# Patient Record
Sex: MALE | Race: WHITE
[De-identification: names, ages, dates, MRNs, and addresses within clinical notes are randomized per-mention and may not be internally consistent; named-entity substitution may affect disease eponyms.]

---

## 2018-02-09 ENCOUNTER — HOSPITAL ENCOUNTER (OUTPATIENT)
Dept: HOSPITAL 68 - ERH | Age: 58
Setting detail: OBSERVATION
LOS: 1 days | End: 2018-02-10
Attending: INTERNAL MEDICINE | Admitting: INTERNAL MEDICINE
Payer: COMMERCIAL

## 2018-02-09 VITALS — BODY MASS INDEX: 20.76 KG/M2 | HEIGHT: 70 IN | WEIGHT: 145 LBS

## 2018-02-09 DIAGNOSIS — R51: ICD-10-CM

## 2018-02-09 DIAGNOSIS — E78.5: ICD-10-CM

## 2018-02-09 DIAGNOSIS — A69.20: ICD-10-CM

## 2018-02-09 DIAGNOSIS — Z23: ICD-10-CM

## 2018-02-09 DIAGNOSIS — I16.0: Primary | ICD-10-CM

## 2018-02-09 DIAGNOSIS — J98.4: ICD-10-CM

## 2018-02-09 DIAGNOSIS — F90.9: ICD-10-CM

## 2018-02-09 LAB
ABSOLUTE GRANULOCYTE CT: 3.7 /CUMM (ref 1.4–6.5)
BASOPHILS # BLD: 0 /CUMM (ref 0–0.2)
BASOPHILS NFR BLD: 0.5 % (ref 0–2)
EOSINOPHIL # BLD: 0.2 /CUMM (ref 0–0.7)
EOSINOPHIL NFR BLD: 3.1 % (ref 0–5)
ERYTHROCYTE [DISTWIDTH] IN BLOOD BY AUTOMATED COUNT: 14 % (ref 11.5–14.5)
GRANULOCYTES NFR BLD: 58.2 % (ref 42.2–75.2)
HCT VFR BLD CALC: 47.8 % (ref 42–52)
LYMPHOCYTES # BLD: 1.9 /CUMM (ref 1.2–3.4)
MCH RBC QN AUTO: 29.7 PG (ref 27–31)
MCHC RBC AUTO-ENTMCNC: 33.4 G/DL (ref 33–37)
MCV RBC AUTO: 88.9 FL (ref 80–94)
MONOCYTES # BLD: 0.5 /CUMM (ref 0.1–0.6)
PLATELET # BLD: 194 /CUMM (ref 130–400)
PMV BLD AUTO: 8.7 FL (ref 7.4–10.4)
RED BLOOD CELL CT: 5.38 /CUMM (ref 4.7–6.1)
WBC # BLD AUTO: 6.4 /CUMM (ref 4.8–10.8)

## 2018-02-09 PROCEDURE — G0480 DRUG TEST DEF 1-7 CLASSES: HCPCS

## 2018-02-09 PROCEDURE — G0008 ADMIN INFLUENZA VIRUS VAC: HCPCS

## 2018-02-09 PROCEDURE — 86618 LYME DISEASE ANTIBODY: CPT

## 2018-02-09 PROCEDURE — G0378 HOSPITAL OBSERVATION PER HR: HCPCS

## 2018-02-09 NOTE — HISTORY & PHYSICAL
Jhon Ivy MD 02/09/18 6940:
General Information and HPI
MD Statement:
I have seen and personally examined JOANNE ROSA and documented this H&P.
 
The patient is a 57 year old M who presented with a patient stated chief 
complaint of [headache, elevated BP].
 
Source of Information: patient
Exam Limitations: no limitations
History of Present Illness:
Patient is a 57-year-old male with a PMH significant for ADHD, HLD, lung disease
who presented to the ED complaining of headache and elevated blood pressure.  
Patient states that for approximately 5 days he has had a gradually worsening 
frontal headache with a pressure-like sensation behind his eyes.  Patient went 
to urgent 2 days prior to admission and recommended that he get a home BP 
machine to monitor his BP as it was elevated and urgent care.  He states that 
over the past several days he has been checking his BP regularly and was 
initially  and has been elevated ever since.  His headache has 
progressively worsened as well.  Does not kept him from sleeping.  Today he 
presented to the ED because his SBP was over 200 he checked at home.  At the 
time of interview and examination he reports that his headache is resolved.  He 
denies any chest pain, visual changes, eye pain, numbness, tingling, weakness, 
lightheadedness, dizziness, loss of consciousness.
 
Allergies/Medications
Allergies:
Coded Allergies:
Penicillins (Intermediate, HIVES 02/09/18)
 
Home Med list
Amlodipine Besylate 5 MG TABLET   1 TAB PO DAILY HIGH BLOOD PRESSURE
Amlodipine Besylate 5 MG TABLET   1 TAB PO DAILY HIGH BLOOD PRESSURE
     Please take as prescribed. 
Doxycycline Hyclate 100 MG CAPSULE   1 CAP PO BID LYME  (Reported)
Lisinopril 10 MG TABLET   1 TAB PO DAILY HYPERTENSION
Simvastatin (Simvastatin*) 20 MG TABLET   1 TAB PO QPM CHOL  (Reported)
 
 
Past History
 
Travel History
Traveled to [place] past 21 day No
 
Medical History
Neurological: NONE
EENT: NONE
Cardiovascular: hyperlipidemia
Respiratory: NONE
Gastrointestinal: NONE
Hepatic: NONE
Renal: NONE
Musculoskeletal: NONE
Psychiatric: ADHD
Endocrine: NONE
Blood Disorders: NONE
Cancer(s): NONE
GYN/Reproductive: NONE
 
Surgical History
Surgical History: non-contributory
 
Past Family/Social History
 
Family History
Relations & Conditions if any
Relation not specified for:
  *No pertinent family history
 
 
Psychosocial History
Smoking Status: Never Smoked
ETOH Use: occasional use
Illicit Drug Use: denies illicit drug use
 
Functional Ability
ADLs
Independent: dressing, eating, toileting, bathing. 
Ambulation: independent
IADLs
Independent: shopping, housework, finances, food prep, telephone, transportation
, medication admin. 
 
Review of Systems
 
Review of Systems
Constitutional:
Reports: malaise.  Denies: chills, diaphoresis, fever. 
EENTM:
Denies: blurred vision, double vision, visual changes. 
Cardiovascular:
Denies: chest pain, orthopena, palpitations, syncope. 
Respiratory:
Denies: cough, short of breath. 
GI:
Reports: no symptoms. 
Genitourinary:
Reports: no symptoms. 
Musculoskeletal:
Reports: no symptoms. 
Skin:
Reports: no symptoms. 
Neurological/Psychological:
Reports: headache.  Denies: numbness, paresthesia, tingling, weakness. 
 
Exam & Diagnostic Data
Last 24 Hrs of Vital Signs/I&O
 Vital Signs
 
 
Date Time Temp Pulse Resp B/P B/P Pulse O2 O2 Flow FiO2
 
     Mean Ox Delivery Rate 
 
02/10 0452  50  162/98     
 
02/10 0330 98.2 62 20 188/100     
 
02/10 0330  62  188/100     
 
02/10 0053  62 20 180/90  98   
 
02/09 2300  63 20 160/102  98   
 
02/09 2233 98.2 20 98 156/90     
 
02/09 2233  68  156/90     
 
02/09 2148 97.2 65 20 160/100     
 
02/09 2017    160/100     
 
02/09 1952  65  176/106     
 
02/09 1951 97.2 65 20 176/106     
 
02/09 1857 97.2 65 20 190/106  98 Room Air  
 
 
 Intake & Output
 
 
 02/10 0800 02/10 0000 02/09 1600
 
Intake Total   
 
Output Total   
 
Balance   
 
    
 
Patient  195 lb 
 
Weight   
 
Weight  Reported by Patient 
 
Measurement   
 
Method   
 
 
 
 
Physical Exam
General Appearance Alert, Oriented X3, Cooperative, No Acute Distress
Skin Temp/Moisture Exam: Warm/Dry
Sepsis Skin Exam (color): Normal for Ethnicity
HEENT Atraumatic, PERRLA, EOMI, Mucous Membr. moist/pink, no papilledema 
appreciated on fundoscopic exam
Neck Supple, No JVD
Cardiovascular Regular Rate, Normal S1, Normal S2
Lungs Clear to Auscultation, Normal Air Movement
Abdomen Normal Bowel Sounds, Soft, No Tenderness
Neurological Normal Speech, Strength at 5/5 X4 Ext, Normal Tone, Sensation 
Intact, Cranial Nerves 3-12 NL
Extremities No Clubbing, No Cyanosis, No Edema
Vascular Normal Pulses, Pulses Symmetrical
Last 24 Hrs of Labs/Leobardo:
 Laboratory Tests
 
02/10/18 0347:
Troponin I < 0.01
 
02/09/18 1919:
Urine Opiates Screen < 100.00, Methadone Screen < 40, Barbiturate Screen < 60, 
Ur Phencyclidine Scrn < 6.00, Amphetamines Screen < 100, U Benzodiazepines Scrn 
< 85, Urine Cocaine Screen < 50, Urine Cannabis Screen < 5.00, Urine Color YEL, 
Urine Clarity CLEAR, Urine pH 6.0, Ur Specific Gravity 1.020, Urine Protein NEG,
Urine Ketones NEG, Urine Nitrite NEG, Urine Bilirubin NEG, Urine Urobilinogen 
0.2, Ur Leukocyte Esterase NEG, Ur Microscopic SEDIMENT EXAMINED, Urine RBC 10-
15  H, Urine WBC RARE, Ur Epithelial Cells RARE, Urine Hemoglobin SMALL  H, 
Urine Glucose NEG
 
02/09/18 1914:
Anion Gap 14, Estimated GFR > 60, BUN/Creatinine Ratio 28.2  H, Glucose 94, 
Calcium 9.7, Total Bilirubin 0.9, AST 30, ALT 47, Alkaline Phosphatase 75, 
Troponin I < 0.01, Total Protein 7.6, Albumin 4.5, Globulin 3.1, Albumin/
Globulin Ratio 1.5, TSH 2.360, Thyroxine (T4) 6.5, CBC w Diff NO MAN DIFF REQ, 
RBC 5.38, MCV 88.9, MCH 29.7, MCHC 33.4, RDW 14.0, MPV 8.7, Gran % 58.2, 
Lymphocytes % 30.3, Monocytes % 7.9, Eosinophils % 3.1, Basophils % 0.5, 
Absolute Granulocytes 3.7, Absolute Lymphocytes 1.9, Absolute Monocytes 0.5, 
Absolute Eosinophils 0.2, Absolute Basophils 0, Lyme Disease Antibody Pending, 
Serum Alcohol < 10.0
 
 
Diagnostic Data
EKG Results
NSR, HR 75
Other Results
Head CT
No acute intracranial pathology.
 
Assessment/Plan
Assessment:
Patient is a 57-year-old male with a PMH significant for ADHD, HLD, lung disease
who presented to the ED complaining of headache and elevated blood pressure.  He
has been checking his BP frequently at home and states that it has increased 
significantly from an SBP of 139 approximately 2 days ago to over 200.  His 
headache is also progressively gotten worse.  He has no focal neurological 
deficits, denies any visual changes, head CT was negative for any acute 
intracranial pathology.  She has no history of hypertension and has never been 
on any antihypertensive medication.  
 
 
In the ED the patient received labetalol IV 10 mg, enalaprilat IV 1.25 mg, 
lisinopril 10 mg.  He was initially going to be discharged after his BP 
decreased however when rechecking it had again increased to 180/90.
 
Problem list
#Hypertensive urgency
#History of ADHD, HLD, Lyme
 
Plan
-Placed in observation on telemetry floor
-Continuous telemetry monitoring
-Cardiology consult in the a.m.
-Start amlodipine 5 mg daily
-Continue home statin dose
- Follow-up lyme titer
-Heart healthy diet
-DVT prophylaxis with subcutaneous heparin, ALPS
-CODE STATUS: Full code
 
As Ranked By This Provider
Problem List:
 1. Hypertensive urgency
 
 
Core Measures/Misc (9/17)
 
Acute Coronary Syndrome
ACS Diagnosis: No
 
Congestive Heart Failure
Congestive Heart Failure Diagnosis No
 
Cerebrovascular Accident
CVA/TIA Diagnosis: No
 
VTE (View Protocol)
VTE Risk Factors Age>40
No Mechanical VTE Prophylaxis d/t N/A MechProphylax Ordered
No VTE Pharm Prophylaxis d/t NA PharmProphylax ordered
 
Sepsis (View protocol)
Sepsis Present: No
 
 
Donaldo ROY,Naren 02/10/18 0114:
Resident Review Statement
Resident Statement: examined this patient, discussed with intern, agreed with 
intern
Other Findings:
 This is a 57-year-old gentleman with a past medical history significant for 
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 
flareups, presents for evaluation of 1 week onset of frontal headaches with pain
radiating bilaterally to the back of his eyes.  Noted recently at an urgent care
screening he was found to have a high blood pressure, prompting him to by an 
ambulatory BP machine and his home recordings were remarkable for some elevated 
BP is allegedly up to the 200.  He denies any chest pain, palpitation, altered 
mental status, creased urinary output or vision changes.
 
Impression
Hypertensive urgency
Hypertension
History of chronic disease: Hyperlipidemia and ADHD
 
Plan
Place in telemetry observation for close cardiac monitoring
Decreased systolic blood pressure no more than 20-25%, will be detrimental to 
significantly decrease BP in the absence of any organ damage
Serial trops and EKG to rule out ACS
Start patient on amlodipine and await cardiology recommendation on suitable BP 
med
Continue statin medication
Echocardiogram in the morning
 
 
Aden Hughes MDalaksdemond 02/10/18 0138:
Attending MD Review Statement
 
Attending Statement
Attending MD Statement: examined this patient, discuss w/resident/PA/NP, agreed 
w/resident/PA/NP, reviewed images, amended to note
Attending Assessment/Plan:
58 yo M with h/o HLD, Lyme disease, ADHD, is here for evaluation of frontal 
headache and pain behind bilateral eyes for past 1 week. Patient went to an 
Urgent care clinic and was noted to have high BP, was asked to measure his BP 
with a home machine which he has been doing. BP has been in the range of 130-200
's. Hence he came to ER. He took tylenol with some relief. He c/o feeling tired 
and fatigued. He feels stressed as his girlfriend is moving to [place] and he 
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 He denies vision changes, chest pain, dyspnea
, palpitations or paresthesias. He has no prior diagnosis of hypertension and 
has not been on any meds.
He also reports diarrhea for past 1 week, but nonbloody, not associated with 
abdominal cramps or N/V.
 
Of note, patient was seen by a Cardiologist at [facility] in 2017 for evaluation 
of Lyme carditis. He underwent EKG and stress test that was essentially normal 
as per patient.
 
Vitals stable except for /106 --> 160/100 --> 188/100 and HR 50-60's. Exam
as above. Labs: troponin neg, thyroid functions normal, BUN 31, creat normal, UA
/ Utox neg. CT head: no acute pathology. EKG: Sinus rhythm, no acute changes. 
He received IV labetalol, vasotec and lisinopril in the ER.
 
Assessment and plan:
1. Hypertensive urgency, no evidence of end-organ damage
2. Newly diagnosed hypertension, need to rule out secondary causes
3. History of hyperlipidemia
4. ADHD
 
- 23 hour observation on Telemetry
- Monitor for arrhythmias
- Keep SBP ~ 140's and then gradually lower to normal
- Initiate amlodipine 5 mg then uptitrate as needed
- IV hydralazine as needed
- Serial EKG and troponin to rule out ACS
- Echocardiogram
- Cardio consult
- Outpatient follow up to rule out secondary causes of hypertension
- Check lipid panel, HbA1c.
- Continue statin
- If diarrhea persists, he needs work up.
DVT ppx Lovenox. Full code.
 
 
Observation Initial Note
-
I have personally examined JOANNE ROSA on 02/10/18 at 0139.
 
The disposition of MOEJOANNE ROSARIO is uncertain at this time and before a 
determination can be made, he requires a period of observation for the following
reasons [Hypertensive urgency]

## 2018-02-09 NOTE — CT SCAN REPORT
EXAMINATION:
CT HEAD WITHOUT CONTRAST
 
CLINICAL INFORMATION:
Hypertension. Retroorbital pressure.
 
COMPARISON:
None
 
TECHNIQUE:
Contiguous axial imaging was performed from the skull base to vertex without
intravenous administration of contrast.
 
DLP:
619 mGy-cm
 
FINDINGS:
There is no evidence of acute intracranial hemorrhage or territorial
infarction. No abnormal mass effect or midline shift is seen. Gray to white
matter differentiation is well preserved. No extra-axial fluid collections
are identified.
 
The ventricles are normal in size. There is no abnormal attenuation within
the brain parenchyma. The osseous structures and soft tissues are normal.
There is minor mucosal thickening in the partially imaged left maxillary
sinus. The mastoid air cells and middle ear cavities are clear. The orbits
appear unremarkable.
 
IMPRESSION:
No acute intracranial pathology.

## 2018-02-10 VITALS — SYSTOLIC BLOOD PRESSURE: 150 MMHG | DIASTOLIC BLOOD PRESSURE: 88 MMHG

## 2018-02-10 VITALS — DIASTOLIC BLOOD PRESSURE: 72 MMHG | SYSTOLIC BLOOD PRESSURE: 132 MMHG

## 2018-02-10 VITALS — SYSTOLIC BLOOD PRESSURE: 130 MMHG | DIASTOLIC BLOOD PRESSURE: 78 MMHG

## 2018-02-10 NOTE — PN- ATT ADDEND
Attending Addendum
Attending Brief Note
The patient was seen and discussed with house staff. Headache resolved and BP 
returned to normal. Troponin levels normal. OK to discharge to home on 
Lisinopril/Norvasc with follow-up with PCP Dr. Sinclair next week.

## 2018-02-10 NOTE — PATIENT DISCHARGE INSTRUCTIONS
Discharge Instructions
 
General Discharge Information
You were seen/treated for:
High Blood Pressure
Special Instructions:
Please follow up with your pcp within 1 week of discharge. 
 
Please follow up with the cardiologist within 1 week. 
 
Please note the medications that have been added and take as prescribed. 
 
Diet
Continue normal diet: No
Recommended Diet: Heart Healthy
 
Activity
Full Activity/No Limits: No
Activity Self Limited: Yes
 
Acute Coronary Syndrome
 
Inclusion Criteria
At DC or during hospital stay patient has or had the following:
ACS DIAGNOSIS No
 
Discharge Core Measures
Meds if any: Prescribed or Continued at Discharge
Meds if any: NOT Prescribed or Continued at Discharge
 
Congestive Heart Failure
 
Inclusion Criteria
At DC or during hospital stay patient has or had the following:
CHF DIAGNOSIS No
 
Discharge Core Measures
Meds if any: Prescribed or Continued at Discharge
Meds if any: NOT Prescribed or Continued at Discharge
 
Cerebrovascular accident
 
Inclusion Criteria
At DC or during hospital stay patient has or had the following:
CVA/TIA Diagnosis No
 
Discharge Core Measures
Meds if any: Prescribed or Continued at Discharge
Meds if any: NOT Prescribed or Continued at Discharge
 
Venous thromboembolism
 
Inclusion Criteria
VTE Diagnosis No
VTE Type NONE
VTE Confirmed by (Test) NONE
 
Discharge Core Measures
- Per Current guidelines, there needs to be overlap
- treatment for the first 5 days of Warfarin therapy.
- If discharged on Warfarin prior to 5 days of
- overlap therapy, the patient will need to be
- assessed for post discharge needs including
- *Post discharge parental anticoagulation
- *Warfarin and/or parental anticoagulation education
- *Follow up date to check INR post discharge
At least 5 days overlap therapy as Inpatient No
Meds if any: Prescribed or Continued at Discharge
Note: Overlap Therapy is Warfarin and Anticoagulant
Meds if any: NOT Prescribed or Continued at Discharge

## 2018-02-10 NOTE — PN-OBSERVATION
Observation Note
 
Observation Note
_
I have personally examined JOANNE ROSA. him disposition is uncertain at this 
time. Before a determination can be made, he requires continued observation for 
the following reasons hypertensive urgency.
 
Assessment/Plan
Assessment:
This is a 57-year-old gentleman with a past medical history significant for 
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 
flareups, presents for evaluation of 1 week onset of frontal headaches with pain
radiating bilaterally to the back of his eyes.  Noted recently at an urgent care
screening he was found to have a high blood pressure, prompting him to by an 
ambulatory BP machine and his home recordings were remarkable for some elevated 
BP is allegedly up to the 200.  He denies any chest pain, palpitation, altered 
mental status, creased urinary output or vision changes.
 
Impression
Hypertensive urgency
Hypertension
History of chronic disease: Hyperlipidemia and ADHD
 
Plan
Continue telemetry observation for close cardiac monitoring
SBP around 150, continue with amlodipine 5mg dose
trops negative
await cardiology recommendation 
Continue statin medication
Echocardiogram in the morning
 
Problem List:
 1. Hypertensive urgency
 
 2. Hypertension
 
 
Subjective
Subjective:
Seen and examined at bedside, patient was comfortably sleeping.  Endorses 
headaches but reports much better compared to previous hours.  Denies any chest 
pain, palpitation, vision changes, altered mental status, neurofocal changes or 
syncope.
 
Review of Systems
Constitutional:
Reports: see HPI. 
 
Objective
Last 24 Hrs of Vital Signs/I&O
 Vital Signs
 
 
Date Time Temp Pulse Resp B/P B/P Pulse O2 O2 Flow FiO2
 
     Mean Ox Delivery Rate 
 
02/10 0634 98.2 55 20 150/88  98 Room Air  
 
02/10 0609  50  160/88     
 
02/10 0606    128/84     
 
02/10 0452  50  162/98     
 
02/10 0330 98.2 62 20 188/100     
 
02/10 0330  62  188/100     
 
02/10 0053  62 20 180/90  98   
 
02/09 2300  63 20 160/102  98   
 
02/09 2233 98.2 20 98 156/90     
 
02/09 2233  68  156/90     
 
02/09 2148 97.2 65 20 160/100     
 
02/09 2017    160/100     
 
02/09 1952  65  176/106     
 
02/09 1951 97.2 65 20 176/106     
 
02/09 1857 97.2 65 20 190/106  98 Room Air  
 
 
 Intake & Output
 
 
 02/10 1600 02/10 0800 02/10 0000
 
Intake Total   
 
Output Total   
 
Balance   
 
    
 
Patient  65.771 kg 88.451 kg
 
Weight   
 
Weight   Reported by Patient
 
Measurement   
 
Method   
 
 
 
 
Physical Exam
General Appearance: Alert, Oriented X3, Cooperative
Other Physical Findings:
Skin Temp/Moisture Exam: Warm/Dry
Sepsis Skin Exam (color): Normal for Ethnicity
HEENT Atraumatic, PERRLA, EOMI, Mucous Membr. moist/pink, no papilledema 
appreciated on fundoscopic exam
Neck Supple, No JVD
Cardiovascular Regular Rate, Normal S1, Normal S2
Lungs Clear to Auscultation, Normal Air Movement
Abdomen Normal Bowel Sounds, Soft, No Tenderness
Neurological Normal Speech, Strength at 5/5 X4 Ext, Normal Tone, Sensation 
Intact, Cranial Nerves 3-12 NL
Extremities No Clubbing, No Cyanosis, No Edema
Vascular Normal Pulses, Pulses Symmetrical
Current Medications:
 Current Medications
 
 
  Sig/Tonia Start time  Last
 
Medication Dose Route Stop Time Status Admin
 
Amlodipine Besylate 5 MG DAILY 02/11 1000 AC 
 
  PO   
 
Amlodipine Besylate 0 .STK-MED ONE 02/10 0326 DC 
 
  PO   
 
Amlodipine Besylate 5 MG ONCE ONE 02/10 0300 DC 02/10
 
  PO 02/10 0301  0330
 
Atorvastatin Calcium 10 MG 1700 02/10 1700 AC 
 
  PO   
 
Enalaprilat 1.25 MG ONCE ONE 02/09 2145 DC 02/09
 
  IV 02/09 2146  2148
 
Enalaprilat 0 .STK-MED ONE 02/09 2145 DC 
 
  IV   
 
Enalaprilat 0 .STK-MED ONE 02/09 2143 DC 
 
  IV   
 
Heparin Sodium  5,000 UNIT Q8 02/10 0626 AC 02/10
 
(Porcine)  SC   0652
 
Influenza Virus  0.5 ML ONCE ONE 02/10 0645 DC 
 
Vaccine  IM 02/10 0646  
 
Labetalol HCl 0 .STK-MED ONE 02/09 1949 DC 
 
  IV   
 
Labetalol HCl 10 MG ONCE ONE 02/09 1900 DC 02/09
 
  IV 02/09 1901  1951
 
Lisinopril 10 MG ONCE ONE 02/09 2230 DC 02/09
 
  PO 02/09 2231  2233
 
Lisinopril 0 .STK-MED ONE 02/09 2230 DC 
 
  PO   
 
Melatonin 5 MG ONCE ONE 02/10 0400 DC 
 
  PO 02/10 0401  
 
 
 
Last 24 Hrs of Labs/Mics:
 Laboratory Tests
 
02/10/18 0654:
Hemoglobin A1c Pending
 
02/10/18 0347:
Troponin I < 0.01, Triglycerides Pending, Cholesterol Pending, LDL Cholesterol, 
Calc Pending, HDL Cholesterol Pending, Cholesterol/HDL Ratio Pending
 
02/09/18 1919:
Urine Opiates Screen < 100.00, Methadone Screen < 40, Barbiturate Screen < 60, 
Ur Phencyclidine Scrn < 6.00, Amphetamines Screen < 100, U Benzodiazepines Scrn 
< 85, Urine Cocaine Screen < 50, Urine Cannabis Screen < 5.00, Urine Color YEL, 
Urine Clarity CLEAR, Urine pH 6.0, Ur Specific Gravity 1.020, Urine Protein NEG,
Urine Ketones NEG, Urine Nitrite NEG, Urine Bilirubin NEG, Urine Urobilinogen 
0.2, Ur Leukocyte Esterase NEG, Ur Microscopic SEDIMENT EXAMINED, Urine RBC 10-
15  H, Urine WBC RARE, Ur Epithelial Cells RARE, Urine Hemoglobin SMALL  H, 
Urine Glucose NEG
 
02/09/18 1914:
Anion Gap 14, Estimated GFR > 60, BUN/Creatinine Ratio 28.2  H, Glucose 94, 
Calcium 9.7, Total Bilirubin 0.9, AST 30, ALT 47, Alkaline Phosphatase 75, 
Troponin I < 0.01, Total Protein 7.6, Albumin 4.5, Globulin 3.1, Albumin/
Globulin Ratio 1.5, TSH 2.360, Thyroxine (T4) 6.5, CBC w Diff NO MAN DIFF REQ, 
RBC 5.38, MCV 88.9, MCH 29.7, MCHC 33.4, RDW 14.0, MPV 8.7, Gran % 58.2, 
Lymphocytes % 30.3, Monocytes % 7.9, Eosinophils % 3.1, Basophils % 0.5, 
Absolute Granulocytes 3.7, Absolute Lymphocytes 1.9, Absolute Monocytes 0.5, 
Absolute Eosinophils 0.2, Absolute Basophils 0, Lyme Disease Antibody Pending, 
Serum Alcohol < 10.0